# Patient Record
Sex: FEMALE | Race: WHITE
[De-identification: names, ages, dates, MRNs, and addresses within clinical notes are randomized per-mention and may not be internally consistent; named-entity substitution may affect disease eponyms.]

---

## 2017-12-28 ENCOUNTER — HOSPITAL ENCOUNTER (EMERGENCY)
Dept: HOSPITAL 25 - ED | Age: 27
LOS: 1 days | Discharge: HOME | End: 2017-12-29
Payer: SELF-PAY
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
## 2017-12-28 DIAGNOSIS — F10.129: Primary | ICD-10-CM

## 2017-12-28 DIAGNOSIS — F17.210: ICD-10-CM

## 2017-12-28 LAB
BASOPHILS # BLD AUTO: 0.1 10^3/UL (ref 0–0.2)
EOSINOPHIL # BLD AUTO: 0.1 10^3/UL (ref 0–0.6)
HCT VFR BLD AUTO: 37 % (ref 35–47)
HGB BLD-MCNC: 12.7 G/DL (ref 12–16)
LYMPHOCYTES # BLD AUTO: 3.7 10^3/UL (ref 1–4.8)
MCH RBC QN AUTO: 31 PG (ref 27–31)
MCHC RBC AUTO-ENTMCNC: 34 G/DL (ref 31–36)
MCV RBC AUTO: 92 FL (ref 80–97)
MONOCYTES # BLD AUTO: 0.4 10^3/UL (ref 0–0.8)
NEUTROPHILS # BLD AUTO: 2.7 10^3/UL (ref 1.5–7.7)
NRBC # BLD AUTO: 0.02 10^3/UL
NRBC BLD QL AUTO: 0.2
PLATELET # BLD AUTO: 231 10^3/UL (ref 150–450)
RBC # BLD AUTO: 4.04 10^6/UL (ref 4–5.4)
WBC # BLD AUTO: 6.9 10^3/UL (ref 3.5–10.8)

## 2017-12-28 PROCEDURE — 80307 DRUG TEST PRSMV CHEM ANLYZR: CPT

## 2017-12-28 PROCEDURE — 80053 COMPREHEN METABOLIC PANEL: CPT

## 2017-12-28 PROCEDURE — 81015 MICROSCOPIC EXAM OF URINE: CPT

## 2017-12-28 PROCEDURE — 96360 HYDRATION IV INFUSION INIT: CPT

## 2017-12-28 PROCEDURE — 36415 COLL VENOUS BLD VENIPUNCTURE: CPT

## 2017-12-28 PROCEDURE — 84702 CHORIONIC GONADOTROPIN TEST: CPT

## 2017-12-28 PROCEDURE — 80329 ANALGESICS NON-OPIOID 1 OR 2: CPT

## 2017-12-28 PROCEDURE — 96372 THER/PROPH/DIAG INJ SC/IM: CPT

## 2017-12-28 PROCEDURE — 99285 EMERGENCY DEPT VISIT HI MDM: CPT

## 2017-12-28 PROCEDURE — 84443 ASSAY THYROID STIM HORMONE: CPT

## 2017-12-28 PROCEDURE — 81003 URINALYSIS AUTO W/O SCOPE: CPT

## 2017-12-28 PROCEDURE — G0480 DRUG TEST DEF 1-7 CLASSES: HCPCS

## 2017-12-28 PROCEDURE — 80320 DRUG SCREEN QUANTALCOHOLS: CPT

## 2017-12-28 PROCEDURE — 96374 THER/PROPH/DIAG INJ IV PUSH: CPT

## 2017-12-28 PROCEDURE — 85025 COMPLETE CBC W/AUTO DIFF WBC: CPT

## 2017-12-29 VITALS — SYSTOLIC BLOOD PRESSURE: 114 MMHG | DIASTOLIC BLOOD PRESSURE: 72 MMHG

## 2018-01-03 NOTE — ED
Marlon MCCLOUD Tecjoon, scribed for Bree Chacko MD on 12/28/17 at 2120 .





Psychiatric Complaint





- HPI Summary


HPI Summary: 





This patient is a 27 year old female BIBA to [facility] with a chief complaint of 

anxiety/panic attack earlier today. Nurse explained that patient had a fight 

with her boyfriend and suffered and anxiety/panic attack. Patient is 

insistent that her asthma was the cause.  Patient states that she could not 

breath, suffered loss of consciousness. Patient, at time of exam, is very 

lethargic and unwilling to answer questions. Patient throws herself on 

stretcher intentionally and claims the TV is the devil. Patient refuses to 

answer any more questions. Patient has been drinking





HPI limited due to level 5 caveat: AMS





- History Of Current Complaint


Chief Complaint: EDGeneral


Time Seen by Provider: 12/28/17 20:55


Hx Obtained From: Patient, EMS


Hx From Patient Unobtainable Due To: Altered Mental Status


Hx Last Menstrual Period: 3/16/16


Onset/Duration: Sudden Onset, Lasting Hours


Timing: Constant


Character: Depressed, Anxious, Lethargic


Aggravating Factor(s): Nothing


Alleviating Factor(s): Nothing


Associated Signs And Symptoms: Positive: Confused





- Allergies/Home Medications


Allergies/Adverse Reactions: 


 Allergies











Allergy/AdvReac Type Severity Reaction Status Date / Time


 


No Known Allergies Allergy   Verified 12/28/17 20:58














PMH/Surg Hx/FS Hx/Imm Hx


Previously Healthy: No - PMHx limited due to level 5 caveat: AMS


Endocrine/Hematology History: 


   Denies: Hx Diabetes, Hx Thyroid Disease


Cardiovascular History: 


   Denies: Hx Congestive Heart Failure, Hx Deep Vein Thrombosis, Hx Hypertension

, Hx Myocardial Infarction, Hx Pacemaker/ICD


Respiratory History: Reports: Hx Asthma - On no medications.


   Denies: Hx Lung Cancer


GI History: 


   Denies: Hx Gall Bladder Disease, Hx Gastrointestinal Bleed, Hx Ulcer, Hx 

Urosepsis


 History: 


   Denies: Hx Kidney Stones, Hx Renal Disease


Neurological History: 


   Denies: Hx Dementia, Hx Migraine, Hx Seizures, Hx Transient Ischemic Attacks 

(TIA)


Psychiatric History: Reports: Hx Anxiety, Hx Depression


   Denies: Hx Schizophrenia, Hx Bipolar Disorder





- Surgical History


Surgery Procedure, Year, and Place: skull surgery as infant





- Immunization History


Date of Tetanus Vaccine: unk


Date of Influenza Vaccine: unk


Infectious Disease History: No


Infectious Disease History: 


   Denies: History Other Infectious Disease, Traveled Outside the [place] in Last 30 

Days





- Family History


Known Family History: 


   Negative: Hypertension


Family History: FHx limited due to level 5 caveat: AMS





- Social History


Lives: Dormitory/Roommates - with boyfriend


Alcohol Use: Weekly


Hx Substance Use: Yes


Substance Use Type: Reports: Marijuana


Substance Use Comment - Amount & Last Used: daily


Hx Tobacco Use: Yes


Smoking Status (MU): Current Some Day Smoker


Amount Used/How Often: 3 cig./week





Review of Systems


Negative: Fever


Positive: Shortness Of Breath


Positive: Weakness


Positive: Anxious, Depressed


All Other Systems Reviewed And Are Negative: No





- Comments


Additional Review of Systems Comments: 





ROS limited due to level 5 caveat: AMS





Physical Exam





- Summary


Physical Exam Summary: 








VITAL SIGNS: Reviewed.


GENERAL:  Patient is a well-developed and nourished female lying in stretcher. 

Patient is very lethargic and seems to be in distress. Patient keeps mentioning 

her asthma.


LUNGS: Clear to auscultation bilaterally. No wheezing or crackles.


CVS: Regular rate and rhythm, S1 and S2 present, no murmurs or gallops 

appreciated.


SKIN: Dry and warm





Patient is reluctant to answer questions.


Throwing herself in stretcher intentionally.





PE limited due to level 5 caveat: AMS


Triage Information Reviewed: Yes


Vital Signs On Initial Exam: 


 Initial Vitals











Temp Pulse Resp BP Pulse Ox


 


 99.3 F   88   18   107/76   100 


 


 12/28/17 20:57  12/28/17 20:57  12/28/17 20:57  12/28/17 20:57  12/28/17 20:57











Vital Signs Reviewed: Yes





- Billy Coma Scale


Coma Scale Total: 13





Diagnostics





- Vital Signs


 Vital Signs











  Temp Pulse Resp BP Pulse Ox


 


 12/28/17 20:57  99.3 F  88  18  107/76  100














- Laboratory


Lab Results: 


 Lab Results











  12/28/17 12/28/17 12/29/17 Range/Units





  21:15 21:15 06:03 


 


WBC   6.9   (3.5-10.8)  10^3/ul


 


RBC   4.04   (4.0-5.4)  10^6/ul


 


Hgb   12.7   (12.0-16.0)  g/dl


 


Hct   37   (35-47)  %


 


MCV   92   (80-97)  fL


 


MCH   31   (27-31)  pg


 


MCHC   34   (31-36)  g/dl


 


RDW   14   (10.5-15)  %


 


Plt Count   231   (150-450)  10^3/ul


 


MPV   7 L   (7.4-10.4)  um3


 


Neut % (Auto)   38.4   (38-83)  %


 


Lymph % (Auto)   53.2 H   (25-47)  %


 


Mono % (Auto)   6.0   (1-9)  %


 


Eos % (Auto)   1.4   (0-6)  %


 


Baso % (Auto)   1.0   (0-2)  %


 


Absolute Neuts (auto)   2.7   (1.5-7.7)  10^3/ul


 


Absolute Lymphs (auto)   3.7   (1.0-4.8)  10^3/ul


 


Absolute Monos (auto)   0.4   (0-0.8)  10^3/ul


 


Absolute Eos (auto)   0.1   (0-0.6)  10^3/ul


 


Absolute Basos (auto)   0.1   (0-0.2)  10^3/ul


 


Absolute Nucleated RBC   0.02   10^3/ul


 


Nucleated RBC %   0.2   


 


Sodium  142    (133-145)  mmol/L


 


Potassium  3.3 L    (3.5-5.0)  mmol/L


 


Chloride  110    (101-111)  mmol/L


 


Carbon Dioxide  19 L    (22-32)  mmol/L


 


Anion Gap  13 H    (2-11)  mmol/L


 


BUN  12    (6-24)  mg/dL


 


Creatinine  0.67    (0.51-0.95)  mg/dL


 


Est GFR ( Amer)  136.8    (>60)  


 


Est GFR (Non-Af Amer)  106.4    (>60)  


 


BUN/Creatinine Ratio  17.9    (8-20)  


 


Glucose  77    ()  mg/dL


 


Calcium  9.4    (8.6-10.3)  mg/dL


 


Total Bilirubin  0.80    (0.2-1.0)  mg/dL


 


AST  27    (13-39)  U/L


 


ALT  22    (7-52)  U/L


 


Alkaline Phosphatase  55    ()  U/L


 


Total Protein  7.3    (6.4-8.9)  g/dL


 


Albumin  4.0    (3.2-5.2)  g/dL


 


Globulin  3.3    (2-4)  g/dL


 


Albumin/Globulin Ratio  1.2    (1-3)  


 


TSH  0.87    (0.34-5.60)  mcIU/mL


 


Beta HCG, Quant  < 0.60    mIU/mL


 


Urine Color     


 


Urine Appearance     


 


Urine pH     (5-9)  


 


Ur Specific Gravity     (1.010-1.030)  


 


Urine Protein     (Negative)  


 


Urine Ketones     (Negative)  


 


Urine Blood     (Negative)  


 


Urine Nitrate     (Negative)  


 


Urine Bilirubin     (Negative)  


 


Urine Urobilinogen     (Negative)  


 


Ur Leukocyte Esterase     (Negative)  


 


Urine WBC (Auto)     (Absent)  


 


Urine RBC (Auto)     (Absent)  


 


Ur Squamous Epith Cells     (Absent)  


 


Urine Bacteria     (Absent)  


 


Urine Glucose     (Negative)  


 


Salicylates  < 2.50    (<30)  mg/dL


 


Urine Opiates Screen    None detected  (None Detect)  


 


Acetaminophen  < 15    mcg/mL


 


Ur Barbiturates Screen    None detected  (None Detect)  


 


Ur Phencyclidine Scrn    None detected  (None Detect)  


 


Ur Amphetamines Screen    None detected  (None Detect)  


 


U Benzodiazepines Scrn    Presumptive positive H  (None Detect)  


 


Urine Cocaine Screen    None detected  (None Detect)  


 


U Cannabinoids Screen    Presumptive positive H  (None Detect)  


 


Serum Alcohol  293 H    (<10)  mg/dL














  12/29/17 Range/Units





  06:03 


 


WBC   (3.5-10.8)  10^3/ul


 


RBC   (4.0-5.4)  10^6/ul


 


Hgb   (12.0-16.0)  g/dl


 


Hct   (35-47)  %


 


MCV   (80-97)  fL


 


MCH   (27-31)  pg


 


MCHC   (31-36)  g/dl


 


RDW   (10.5-15)  %


 


Plt Count   (150-450)  10^3/ul


 


MPV   (7.4-10.4)  um3


 


Neut % (Auto)   (38-83)  %


 


Lymph % (Auto)   (25-47)  %


 


Mono % (Auto)   (1-9)  %


 


Eos % (Auto)   (0-6)  %


 


Baso % (Auto)   (0-2)  %


 


Absolute Neuts (auto)   (1.5-7.7)  10^3/ul


 


Absolute Lymphs (auto)   (1.0-4.8)  10^3/ul


 


Absolute Monos (auto)   (0-0.8)  10^3/ul


 


Absolute Eos (auto)   (0-0.6)  10^3/ul


 


Absolute Basos (auto)   (0-0.2)  10^3/ul


 


Absolute Nucleated RBC   10^3/ul


 


Nucleated RBC %   


 


Sodium   (133-145)  mmol/L


 


Potassium   (3.5-5.0)  mmol/L


 


Chloride   (101-111)  mmol/L


 


Carbon Dioxide   (22-32)  mmol/L


 


Anion Gap   (2-11)  mmol/L


 


BUN   (6-24)  mg/dL


 


Creatinine   (0.51-0.95)  mg/dL


 


Est GFR ( Amer)   (>60)  


 


Est GFR (Non-Af Amer)   (>60)  


 


BUN/Creatinine Ratio   (8-20)  


 


Glucose   ()  mg/dL


 


Calcium   (8.6-10.3)  mg/dL


 


Total Bilirubin   (0.2-1.0)  mg/dL


 


AST   (13-39)  U/L


 


ALT   (7-52)  U/L


 


Alkaline Phosphatase   ()  U/L


 


Total Protein   (6.4-8.9)  g/dL


 


Albumin   (3.2-5.2)  g/dL


 


Globulin   (2-4)  g/dL


 


Albumin/Globulin Ratio   (1-3)  


 


TSH   (0.34-5.60)  mcIU/mL


 


Beta HCG, Quant   mIU/mL


 


Urine Color  Yellow  


 


Urine Appearance  Clear  


 


Urine pH  5.0  (5-9)  


 


Ur Specific Gravity  1.015  (1.010-1.030)  


 


Urine Protein  Negative  (Negative)  


 


Urine Ketones  Trace H  (Negative)  


 


Urine Blood  1+ H  (Negative)  


 


Urine Nitrate  Negative  (Negative)  


 


Urine Bilirubin  Negative  (Negative)  


 


Urine Urobilinogen  Negative  (Negative)  


 


Ur Leukocyte Esterase  Negative  (Negative)  


 


Urine WBC (Auto)  Trace(0-5/hpf)  (Absent)  


 


Urine RBC (Auto)  Trace(0-2/hpf)  (Absent)  


 


Ur Squamous Epith Cells  Present H  (Absent)  


 


Urine Bacteria  Absent  (Absent)  


 


Urine Glucose  Negative  (Negative)  


 


Salicylates   (<30)  mg/dL


 


Urine Opiates Screen   (None Detect)  


 


Acetaminophen   mcg/mL


 


Ur Barbiturates Screen   (None Detect)  


 


Ur Phencyclidine Scrn   (None Detect)  


 


Ur Amphetamines Screen   (None Detect)  


 


U Benzodiazepines Scrn   (None Detect)  


 


Urine Cocaine Screen   (None Detect)  


 


U Cannabinoids Screen   (None Detect)  


 


Serum Alcohol   (<10)  mg/dL











Result Diagrams: 


 12/28/17 21:15





 12/28/17 21:15


Lab Statement: Any lab studies that have been ordered have been reviewed, and 

results considered in the medical decision making process.





Course/Dx





- Course


Course Of Treatment: This patient is a 27 year old female BIBA to [facility] with a 

chief complaint of anxiety/panic attack earlier today. Nurse explained that 

patient had a fight with her boyfriend and suffered and anxiety/panic attack. 

Urinalysis Obtained. In the ED course the patient was given Geodon, Ativan, 

Haldol, Benadryl, Valium.  At 2204, Patient ran out of ED and had to be 

restrained.  As of 0605, patient is cleared for psych evaluation.  Patient to 

be signed out at end of shift to Dr. Bravo, awaiting [facility].





- Differential Dx/Clinical Impression


Provider Diagnosis: 


 Alcohol intoxication








- Physician Notifications


Patient Is Medically Stable For: Psych Evaluation - 0605





Discharge





- Discharge Plan


Condition: Stable


Disposition: HOME


Referrals: 


No Primary Care Phys,NOPCP [Primary Care Provider] - 





The documentation as recorded by the Marlon jang Tecjoon accurately reflects 

the service I personally performed and the decisions made by me, Bree Chacko MD.

## 2018-04-17 ENCOUNTER — HOSPITAL ENCOUNTER (EMERGENCY)
Dept: HOSPITAL 25 - UCEAST | Age: 28
Discharge: HOME | End: 2018-04-17
Payer: SELF-PAY

## 2018-04-17 VITALS — SYSTOLIC BLOOD PRESSURE: 103 MMHG | DIASTOLIC BLOOD PRESSURE: 63 MMHG

## 2018-04-17 DIAGNOSIS — L30.8: Primary | ICD-10-CM

## 2018-04-17 DIAGNOSIS — Z72.0: ICD-10-CM

## 2018-04-17 DIAGNOSIS — J45.909: ICD-10-CM

## 2018-04-17 PROCEDURE — G0463 HOSPITAL OUTPT CLINIC VISIT: HCPCS

## 2018-04-17 PROCEDURE — 99212 OFFICE O/P EST SF 10 MIN: CPT

## 2018-04-18 NOTE — UC
Skin Complaint HPI





- HPI Summary


HPI Summary: 





28 yo female with PMH for asthma, meds- ventolin presents with itchy red rash x 

4 days.  patient states priro to rash had viral illness treated with herbal 

supplements that she had taken before, rash appeared when cold improved.  no 

prior h/o rashs, has been using daina on rash to conrol ithcy, rash located over 

trunk, extremities, sparing face, took benadryl one night- no improvement    

has been worsening since started and spreading to new areas.   no SOB, chest 

pain, otherwise feeling normal.  





- History of Current Complaint


Chief Complaint: UCRash


Time Seen by Provider: 04/17/18 13:47


Stated Complaint: RASH


Hx Obtained From: Patient


Hx Last Menstrual Period: 4/15/18


Pregnant?: No


Onset/Duration: Sudden Onset, Lasting Days


Onset Severity: Mild


Current Severity: Mild


Pain Intensity: 2


Pain Scale Used: 0-10 Numeric


Location: Diffuse





- Allergy/Home Medications


Allergies/Adverse Reactions: 


 Allergies











Allergy/AdvReac Type Severity Reaction Status Date / Time


 


No Known Allergies Allergy   Verified 04/17/18 13:19











Home Medications: 


 Home Medications





Albuterol HFA INHALER* [Ventolin HFA Inhaler*] 1 puff INH Q4H PRN 04/17/18 [

History Confirmed 04/17/18]


diphenhydrAMINE HCl [Benadryl Allergy] 25 mg PO 04/17/18 [History]











Review of Systems


Skin: Rash, Other - itching


Is Patient Immunocompromised?: No


All Other Systems Reviewed And Are Negative: Yes





PMH/Surg Hx/FS Hx/Imm Hx


Previously Healthy: Yes - astham 


Other History Of: 


   Negative For: HIV, Hepatitis B, Hepatitis C





- Surgical History


Surgical History: Yes


Surgery Procedure, Year, and Place: skull surgery as infant





- Family History


Known Family History: Positive: None


   Negative: Hypertension


Family History: FHx limited due to level 5 caveat: AMS





- Social History


Alcohol Use: Weekly


Substance Use Type: None


Substance Use Comment - Amount & Last Used: daily


Smoking Status (MU): Current Some Day Smoker


Amount Used/How Often: 3 cig./week


Household Exposure Type: Cigarettes





Physical Exam


Triage Information Reviewed: Yes


Appearance: Well-Appearing, No Pain Distress, Well-Nourished


Vital Signs: 


 Initial Vital Signs











Temp  98.4 F   04/17/18 13:16


 


Pulse  61   04/17/18 13:16


 


Resp  18   04/17/18 13:16


 


BP  103/63   04/17/18 13:16


 


Pulse Ox  100   04/17/18 13:16











Eyes: Positive: Conjunctiva Clear


Respiratory: Positive: Chest non-tender


Skin: Positive: rashes - over B/l UE, abdomen, flanks folicular raised mild 

erythematous rash with excoriations noted throughout, no burrowing, weeping, no 

vesicles, non-tender to touch, no warmth, no urticaria lesions seen.   worst 

over b/l flanks.   areas covered with fine white powdery residue- patient 

states daina.





Course/Dx





- Course


Course Of Treatment: dermatitis from medication or viral exanthem   medrol dose 

pack, follow up with derm if symptoms do not improve or worsen.





- Diagnoses


Provider Diagnoses: Dermatitis, allergic





Discharge





- Sign-Out/Discharge


Documenting (check all that apply): Discharge





- Discharge Plan


Condition: Good


Disposition: HOME


Prescriptions: 


methylPREDNISolone [Medrol Dosepak 4 MG*] 0 mg PO .SEE DEBBIE INSTRUCTION #1 debbie


Patient Education Materials:  Dermatitis (ED)


Referrals: 


No Primary Care Phys,NOPCP [Primary Care Provider] - 


Elizabet Marroquin MD [Medical Doctor] - 


Additional Instructions: 


- cortisone cream on back, stomach, extremities for itching 


- Steriod dose pack- take as directed 


- Increase fluids 


- no new meds, supplements, evaluate home for new meds, exposures 


- Follow up with dermatology if no improvement 





- Billing Disposition and Condition


Condition: GOOD


Disposition: HOME